# Patient Record
Sex: MALE | Race: BLACK OR AFRICAN AMERICAN | NOT HISPANIC OR LATINO | URBAN - METROPOLITAN AREA
[De-identification: names, ages, dates, MRNs, and addresses within clinical notes are randomized per-mention and may not be internally consistent; named-entity substitution may affect disease eponyms.]

---

## 2024-06-04 ENCOUNTER — EMERGENCY (EMERGENCY)
Facility: HOSPITAL | Age: 64
LOS: 1 days | Discharge: HOME | End: 2024-06-04
Admitting: STUDENT IN AN ORGANIZED HEALTH CARE EDUCATION/TRAINING PROGRAM
Payer: COMMERCIAL

## 2024-06-04 VITALS
TEMPERATURE: 98 F | SYSTOLIC BLOOD PRESSURE: 158 MMHG | HEART RATE: 92 BPM | OXYGEN SATURATION: 96 % | HEIGHT: 71 IN | DIASTOLIC BLOOD PRESSURE: 93 MMHG | RESPIRATION RATE: 18 BRPM | WEIGHT: 227.08 LBS

## 2024-06-04 DIAGNOSIS — Z91.148 PATIENT'S OTHER NONCOMPLIANCE WITH MEDICATION REGIMEN FOR OTHER REASON: ICD-10-CM

## 2024-06-04 DIAGNOSIS — R10.9 UNSPECIFIED ABDOMINAL PAIN: ICD-10-CM

## 2024-06-04 DIAGNOSIS — Z88.0 ALLERGY STATUS TO PENICILLIN: ICD-10-CM

## 2024-06-04 DIAGNOSIS — I10 ESSENTIAL (PRIMARY) HYPERTENSION: ICD-10-CM

## 2024-06-04 DIAGNOSIS — E11.9 TYPE 2 DIABETES MELLITUS WITHOUT COMPLICATIONS: ICD-10-CM

## 2024-06-04 DIAGNOSIS — T44.6X6A UNDERDOSING OF ALPHA-ADRENORECEPTOR ANTAGONISTS, INITIAL ENCOUNTER: ICD-10-CM

## 2024-06-04 PROCEDURE — 99284 EMERGENCY DEPT VISIT MOD MDM: CPT

## 2024-06-04 NOTE — ED ADULT TRIAGE NOTE - CHIEF COMPLAINT QUOTE
Pt, in [facility] custody with reported hx of HTN, DM and recent dx of left renal stone (at [facility] yesterday), presents to ER c/o left flank "10/10" pain (pt unable to get prescriptions filled) without any associated symptoms since earlier in evening.

## 2024-06-05 PROCEDURE — 99285 EMERGENCY DEPT VISIT HI MDM: CPT | Mod: 25

## 2024-06-05 PROCEDURE — 96372 THER/PROPH/DIAG INJ SC/IM: CPT

## 2024-06-05 RX ORDER — TAMSULOSIN HYDROCHLORIDE 0.4 MG/1
0.4 CAPSULE ORAL AT BEDTIME
Refills: 0 | Status: DISCONTINUED | OUTPATIENT
Start: 2024-06-05 | End: 2024-06-08

## 2024-06-05 RX ORDER — KETOROLAC TROMETHAMINE 30 MG/ML
15 SYRINGE (ML) INJECTION ONCE
Refills: 0 | Status: DISCONTINUED | OUTPATIENT
Start: 2024-06-05 | End: 2024-06-05

## 2024-06-05 RX ADMIN — Medication 15 MILLIGRAM(S): at 01:04

## 2024-06-05 RX ADMIN — TAMSULOSIN HYDROCHLORIDE 0.4 MILLIGRAM(S): 0.4 CAPSULE ORAL at 01:03

## 2024-06-05 NOTE — ED PROVIDER NOTE - OBJECTIVE STATEMENT
63 yr old male, history of HTN, DM, presents to the Emergency Department w flank pain. pt was diagnosed w left sided kidney stone at [facility] yesterday, after presenting w flank pain. pt was discharged w pain medication and flomax. currently under arrest w nypd and was unable to take his medications. L flank pain same as yesterday. no fever, n/v, abd pain. no difficulty urinating, dysuria, hematuria. requesting flomax and pain medication.

## 2024-06-05 NOTE — ED PROVIDER NOTE - NSFOLLOWUPINSTRUCTIONS_ED_ALL_ED_FT
Contuinue all medications for kidney stone as prescribed at [facility] yesterday.      Follow up with UROLOGY within 1 week for continued evaluation. Call office listed today in the morning to make an appointment.     Urology Clinic   [address]  [phone]     Return to this Emergency Department if you experience more that 6 hours of pain that cannot be controlled by medication as it has been directed, bloody urine with clots that are preventing you from being able to urinate, or inability to urinate for unknown reason, severe abdominal/flank/back pain, uncontrollable excessive vomiting, any fevers, or any other concerns.

## 2024-06-05 NOTE — ED PROVIDER NOTE - CLINICAL SUMMARY MEDICAL DECISION MAKING FREE TEXT BOX
history of HTN, DM, diagnosed w left sided kidney stone at [facility] yesterday, now currently under arrest w nypd and was unable to take his medications. L flank pain same as yesterday. no associated urinary or infectious symptoms. requesting flomax and pain medication.   pt nontoxic appearing, stable vitals, exam reassuring - no abd tenderness, no CVA tenderness.   will give dose of toradol and percocet as well as dose of flomax.   do not feel need for repeat imaging based on reassuring hx and exam.   UA ordered but pt does not want to wait to give sample. says no infection on testing yesterday.   has rx already sent and has uro follow up planned.     Discharge plan and return precautions d/w pt who verbalized understanding and agrees with plan. All questions answered. Vitals WNL. Ready for d/c.

## 2024-06-05 NOTE — ED ADULT NURSE NOTE - CHIEF COMPLAINT QUOTE
Pt, in [facility] custody with reported hx of HTN, DM and recent dx of left renal stone (at [facility] yesterday), presents to ER c/o left flank "10/10" pain (pt unable to get prescriptions filled) without any associated symptoms since earlier in evening.

## 2024-06-05 NOTE — ED ADULT NURSE NOTE - OBJECTIVE STATEMENT
Pt with PMH HTN, DM and recent dx of left renal stone yesterday presents to ED in [employer] custody c/o  left flank "10/10" pain (pt unable to get prescriptions filled), Denies CP, SOB, n/v/d, f/c, dizziness, headache

## 2024-06-05 NOTE — ED PROVIDER NOTE - PATIENT PORTAL LINK FT
You can access the FollowMyHealth Patient Portal offered by [facility] by registering at the following website: [email or web address]. By joining [employer]’s FollowMyHealth portal, you will also be able to view your health information using other applications (apps) compatible with our system.

## 2024-06-05 NOTE — ED ADULT NURSE NOTE - NSFALLRISKINTERV_ED_ALL_ED
